# Patient Record
Sex: MALE | URBAN - METROPOLITAN AREA
[De-identification: names, ages, dates, MRNs, and addresses within clinical notes are randomized per-mention and may not be internally consistent; named-entity substitution may affect disease eponyms.]

---

## 2017-08-04 ENCOUNTER — FOLLOW UP ESTABLISHED (OUTPATIENT)
Dept: URBAN - METROPOLITAN AREA CLINIC 10 | Facility: CLINIC | Age: 27
End: 2017-08-04
Payer: COMMERCIAL

## 2017-08-04 PROCEDURE — 92014 COMPRE OPH EXAM EST PT 1/>: CPT | Performed by: OPHTHALMOLOGY

## 2017-08-04 PROCEDURE — 92134 CPTRZ OPH DX IMG PST SGM RTA: CPT | Performed by: OPHTHALMOLOGY

## 2017-08-04 ASSESSMENT — INTRAOCULAR PRESSURE
OD: 14
OS: 8

## 2018-12-07 ENCOUNTER — FOLLOW UP ESTABLISHED (OUTPATIENT)
Dept: URBAN - METROPOLITAN AREA CLINIC 10 | Facility: CLINIC | Age: 28
End: 2018-12-07
Payer: COMMERCIAL

## 2018-12-07 DIAGNOSIS — H44.23 DEGENERATIVE MYOPIA, BILATERAL: ICD-10-CM

## 2018-12-07 DIAGNOSIS — H31.012 MACULA SCARS OF POSTERIOR POLE (POST-TRAUMATIC), LEFT EYE: ICD-10-CM

## 2018-12-07 DIAGNOSIS — H33.42 TRACTION DETACHMENT OF RETINA, LEFT EYE: Primary | ICD-10-CM

## 2018-12-07 PROCEDURE — 92014 COMPRE OPH EXAM EST PT 1/>: CPT | Performed by: OPHTHALMOLOGY

## 2018-12-07 PROCEDURE — 92134 CPTRZ OPH DX IMG PST SGM RTA: CPT | Performed by: OPHTHALMOLOGY

## 2018-12-07 ASSESSMENT — INTRAOCULAR PRESSURE
OS: 8
OD: 15

## 2022-06-29 ENCOUNTER — OFFICE VISIT (OUTPATIENT)
Dept: URBAN - METROPOLITAN AREA CLINIC 10 | Facility: CLINIC | Age: 32
End: 2022-06-29
Payer: COMMERCIAL

## 2022-06-29 DIAGNOSIS — H52.4 PRESBYOPIA: ICD-10-CM

## 2022-06-29 DIAGNOSIS — H31.012 MACULA SCARS OF POSTERIOR POLE (POSTINFLAMMATORY) (POST-TRAUMATIC), LEFT EYE: ICD-10-CM

## 2022-06-29 DIAGNOSIS — H33.42 TRACTION DETACHMENT OF RETINA, LEFT EYE: ICD-10-CM

## 2022-06-29 DIAGNOSIS — H44.23 DEGENERATIVE MYOPIA, BILATERAL: Primary | ICD-10-CM

## 2022-06-29 PROCEDURE — 99204 OFFICE O/P NEW MOD 45 MIN: CPT | Performed by: STUDENT IN AN ORGANIZED HEALTH CARE EDUCATION/TRAINING PROGRAM

## 2022-06-29 ASSESSMENT — VISUAL ACUITY: OD: 20/25

## 2022-06-29 ASSESSMENT — INTRAOCULAR PRESSURE
OS: 10
OD: 16

## 2022-06-29 NOTE — IMPRESSION/PLAN
Impression: Macular scar and traction OS Plan: OBSERVE  No action OS. ATTACHED and FLAT OD. No action. End stage. RETINA yearly.

## 2022-06-29 NOTE — IMPRESSION/PLAN
Impression: Degenerative myopia, bilateral Plan: HIGH risk. Explained at length and reviewed with patient the R/b/a of retinal tear or detachment, including the baseline 2-5% estimated lifetime risk of retinal tear or detachment irrespective of any surgery in such severely myopic pts w prioir RD in other eye. Pt to return immediately for retinal eval if any symptoms (floaters, photopsias, or visual field loss).

## 2022-06-29 NOTE — IMPRESSION/PLAN
Impression: Severe D-3 PVR w VA loss. Prior [facility] surgx fail. RD recur. Unable recover OS. Plan: Total RD, severe OS.